# Patient Record
Sex: FEMALE | Race: WHITE | NOT HISPANIC OR LATINO | URBAN - METROPOLITAN AREA
[De-identification: names, ages, dates, MRNs, and addresses within clinical notes are randomized per-mention and may not be internally consistent; named-entity substitution may affect disease eponyms.]

---

## 2024-05-26 ENCOUNTER — HOSPITAL ENCOUNTER (OUTPATIENT)
Dept: RADIOLOGY | Facility: CLINIC | Age: 39
Discharge: HOME | End: 2024-05-26
Payer: COMMERCIAL

## 2024-05-26 DIAGNOSIS — M79.671 RIGHT FOOT PAIN: ICD-10-CM

## 2024-05-26 PROCEDURE — 73630 X-RAY EXAM OF FOOT: CPT | Mod: RIGHT SIDE | Performed by: STUDENT IN AN ORGANIZED HEALTH CARE EDUCATION/TRAINING PROGRAM

## 2024-05-26 PROCEDURE — 73630 X-RAY EXAM OF FOOT: CPT | Mod: RT

## 2024-10-14 NOTE — PROGRESS NOTES
Occupational Therapy    Occupational Therapy Evaluation    Name: Linda Vieira  MRN: [id number]  : 1985  Date: 10/18/24    Time in: 1000 am  Time out: 1050 am  Total time: 50 minutes    Visit#: 1  Insurance reviewed (per information provided by  pre-cert team)  Authorization required:  N  Approved # of visits: medical necessity    Script: OT  POR: Dr. Mickey Clinton    Assessment:Patient was referred to Occupational therapy this date for splint fabrication for left ETR of LMF.OT fabricated thermoplastic volar wrist splint and yolk splint for her left hand to be worn at all times.    Patient was instructed in rationale behind splint purpose, wearing schedule, precautions and care of splint.  Patient verbalized good understanding and demonstrated good ability to don/doff splint correctly.  Pt. Verbalized comfortable fit.    Patient was instructed to call OT with fit issues and/or questions.  Patient to follow up with physician on 10/21/2024.    OT Assessment  OT Assessment Results: Decreased ADL status, Decreased upper extremity range of motion, Decreased upper extremity strength, Decreased fine motor control, Decreased functional mobility  Strengths: Ability to acquire knowledge   Patient would benefit from skilled OT in order to increase increase functional mobility, strength, and decrease pain .      Skilled OT intervention plan includes: education/instruction, home program, orthotic fitting and training.  Plan of care was developed with input and agreement by the patient.       Pt. Left clinic with all questions answered  Eval complexity is low    Plan: Pt. To be seen 1 x a week for 8 weeks   1.  Patient to improve QuickDash score to at or below 20 for increased independence with ADLs/home/work tasks  2.  Patient to don and doff splint without difficulty and verbalize understanding of wearing schedule and precautions.   3. Complete hand evaluation and set further goals at that time according to ETR  protocol    Rehab potential is good  Pt.'s goal is to increase functional mobility  Pt. Agrees with POC    Subjective   Current Problem:  1. Decreased range of motion of finger of left hand  Follow Up In Occupational Therapy        General:   OT Received On: 10/17/24  General  Reason for Referral: eval and treat Extensor tendon laceration repair with splinting  Referred By: Dr. Mickey Clinton  Precautions:  Precautions  STEADI Fall Risk Score (The score of 4 or more indicates an increased risk of falling): low  Precautions Comment: ETR protocol       Pain Assessment:  Pain Assessment  Pain Assessment: 0-10  0-10 (Numeric) Pain Score: 7  Pain Type: Surgical pain  Pain Location: Hand  Pain Orientation: Left  Pain Descriptors: Aching, Discomfort, Sore, Tender, Throbbing, Tightness  Pain Frequency: Intermittent  Clinical Progression: Not changed       Patient is a 39 old who attends OT evaluation today for splint fabrication from Dr. Mickey Clinton. She presents to clinic in bulky dressing from physician appt. she reports no complaints of pain at rest and no drainage from sutures.  Her  chief complaint is decreased range of motion of left hand and wrist.  Her goal for Occupational Therapy is to regain full use of her left  hand and wrist    PMH includes: Pt. Cut hand while doing dishes.  Injury on 2024 and surgery on 10/10/2024.    Medical Screening:   Reviewed medical history form with patient and medical screening assessed.     Precautions:   Fall Risk: low    Objective   AROM and strength NT due to healing/protocol, general information gathered and splint fabrication this date.  Will complete evaluation in future visits.    TREATMENT:  Orthotic Fittin min  Thermoplastic volar wrist splint and yolk splint fabricated    Therapeutic Exercise: 5 min   wear/care/precautions regarding splint    Cognition  Overall Cognitive Status: Within Functional Limits       Home Living:  Home Living  Type of Home: House  Lives  With: Spouse, Dependent children  Home Layout: Two level  Prior Function Per Pt/Caregiver Report:  Prior Function Per Pt/Caregiver Report  Hand Dominance: Right  IADL History:  Occupation: Full time employment  Type of Occupation:     Outcome Measures:  OT Adult Other Outcome Measures  Other Outcome Measures: quick dash (40)    OP EDUCATION:  Education  Individual(s) Educated: Patient  Education Provided: Diagnosis & Precautions, Orthotics wearing schedule and precautions, Risk and benefits of OT discussed with patient or other, POC discussed and agreed upon  Home Program: Orthotic wearing schedule, care and precautions  Equipment: Orthotic(s), Stockinette  Risk and Benefits Discussed with Patient/Caregiver/Other: yes  Patient/Caregiver Demonstrated Understanding: yes  Plan of Care Discussed and Agreed Upon: yes  Patient Response to Education: Patient/Caregiver Verbalized Understanding of Information

## 2024-10-17 ENCOUNTER — EVALUATION (OUTPATIENT)
Dept: OCCUPATIONAL THERAPY | Facility: CLINIC | Age: 39
End: 2024-10-17
Payer: COMMERCIAL

## 2024-10-17 DIAGNOSIS — M25.642 DECREASED RANGE OF MOTION OF FINGER OF LEFT HAND: Primary | ICD-10-CM

## 2024-10-17 PROCEDURE — 97165 OT EVAL LOW COMPLEX 30 MIN: CPT | Mod: GO

## 2024-10-17 PROCEDURE — 97763 ORTHC/PROSTC MGMT SBSQ ENC: CPT | Mod: GO

## 2024-10-18 ASSESSMENT — PAIN DESCRIPTION - DESCRIPTORS: DESCRIPTORS: ACHING;DISCOMFORT;SORE;TENDER;THROBBING;TIGHTNESS

## 2024-10-18 ASSESSMENT — PATIENT HEALTH QUESTIONNAIRE - PHQ9
2. FEELING DOWN, DEPRESSED OR HOPELESS: NOT AT ALL
1. LITTLE INTEREST OR PLEASURE IN DOING THINGS: NOT AT ALL
SUM OF ALL RESPONSES TO PHQ9 QUESTIONS 1 AND 2: 0

## 2024-10-18 ASSESSMENT — PAIN - FUNCTIONAL ASSESSMENT: PAIN_FUNCTIONAL_ASSESSMENT: 0-10

## 2024-10-18 ASSESSMENT — PAIN SCALES - GENERAL: PAINLEVEL_OUTOF10: 7

## 2024-10-24 ENCOUNTER — TREATMENT (OUTPATIENT)
Dept: OCCUPATIONAL THERAPY | Facility: CLINIC | Age: 39
End: 2024-10-24
Payer: COMMERCIAL

## 2024-10-24 DIAGNOSIS — M25.642 DECREASED RANGE OF MOTION OF FINGER OF LEFT HAND: ICD-10-CM

## 2024-10-24 PROCEDURE — 97763 ORTHC/PROSTC MGMT SBSQ ENC: CPT | Mod: GO

## 2024-10-24 NOTE — PROGRESS NOTES
Occupational Therapy  Occupational Therapy Progress Note    Patient Name Linda Vieira   MRN: [id number]  Today's Date: 10/24/2024     Time in: 830 am  Time out: 850 am  Total time: 20 minutes    Insurance:    (per information provided by  pre-cert team)  Visit #: 2  Approval required:  N  # of visits approved:  medical necessity    Therapy Diagnoses:   1. Decreased range of motion of finger of left hand  Follow Up In Occupational Therapy          General:  Reason for visit: Eval and treat , s/p LMF extensor tendon repair, splinting  Referred by: Dr. Mickey Strickland MD appt:  11/4/2024  Script:  OT  Onset Date:  9/17/2024, Surgery 10/10/2024    Subjective:   Patient reports:  Saw the  , sutures out.  Need adjustment with yolk splint, too big now, and middle finger needs more extension per physician    Have you fallen since last visit:  N    Precautions: low fall risk  ETR protocol    Pain:  5/10  Location/Type of pain:  left hand, tenderness at surgical site, ache, discomfort, soreness, tightness    HEP compliance/understanding:  compliant with splint wear at all times.    Objective:   No measurements taken this date, will assess with measurements when appropriate according to healing and ETR protocol.   Re- fabricated yolk splint as edema has decreased and bulky dressing removed, therefore, original yolk splint too big.  Pt. Verbalized good fit with new yolk splint and agreed to phone dept. If any questions/concerns would arise.  Pt. Left clinic with all questions answered.     Treatment:   **= HEP progression today NV= Next visit  np= not performed  nb= non-billable  G= group HEP= discharged to [facility]      Other:     20           minutes  New thermoplastic yolk splint fabricated with review of wear/care/precautions discussed.  Pt. Verbalized comfortable fit and understanding of splint care and precautions.     Evaluation Complexity: Low      Assessment:  Patient tolerated treatment well, did well with  progression this date re fabricating new yolk splint. Additional stockinette and splint strapping issued.   Patient needs continued work on/skilled OT for: LUE to address remaining functional, objective and subjective deficits to allow them to return to prior /optimal level of function with ADLs/home/work tasks.  Patient is progressing with goals:  compliance with splint wear  Skilled care:  splinting, pt. Education, home program instruction    Plan:     1.  Patient to improve QuickDash score to at or below 20 for increased independence with ADLs/home/work tasks  2.  Patient to don and doff splint without difficulty and verbalize understanding of wearing schedule and precautions.   3. Complete hand evaluation and set further goals at that time according to ETR protocol  Continue to progress per poc:   NV continue to monitor splint and progress according to ETR protocol.

## 2024-10-28 ENCOUNTER — APPOINTMENT (OUTPATIENT)
Dept: OCCUPATIONAL THERAPY | Facility: CLINIC | Age: 39
End: 2024-10-28
Payer: COMMERCIAL

## 2024-11-07 ENCOUNTER — APPOINTMENT (OUTPATIENT)
Dept: OCCUPATIONAL THERAPY | Facility: CLINIC | Age: 39
End: 2024-11-07
Payer: COMMERCIAL

## 2024-11-11 ENCOUNTER — APPOINTMENT (OUTPATIENT)
Dept: OCCUPATIONAL THERAPY | Facility: CLINIC | Age: 39
End: 2024-11-11
Payer: COMMERCIAL

## 2024-11-18 ENCOUNTER — APPOINTMENT (OUTPATIENT)
Dept: OCCUPATIONAL THERAPY | Facility: CLINIC | Age: 39
End: 2024-11-18
Payer: COMMERCIAL

## 2024-11-25 ENCOUNTER — APPOINTMENT (OUTPATIENT)
Dept: OCCUPATIONAL THERAPY | Facility: CLINIC | Age: 39
End: 2024-11-25
Payer: COMMERCIAL

## 2024-11-25 ENCOUNTER — DOCUMENTATION (OUTPATIENT)
Dept: OCCUPATIONAL THERAPY | Facility: CLINIC | Age: 39
End: 2024-11-25

## 2024-11-25 NOTE — PROGRESS NOTES
Occupational Therapy                 Therapy Communication Note    Patient Name: Linda Vieria  MRN: [id number]  Department:   Room: Room/bed info not found  Today's Date: 11/25/2024     Discipline: Occupational Therapy    Missed Visit Reason:  Pt. Cancelled via my chart    Missed Time: Cancel

## 2024-12-09 ENCOUNTER — APPOINTMENT (OUTPATIENT)
Dept: OCCUPATIONAL THERAPY | Facility: CLINIC | Age: 39
End: 2024-12-09
Payer: COMMERCIAL